# Patient Record
Sex: FEMALE | Race: BLACK OR AFRICAN AMERICAN | NOT HISPANIC OR LATINO | Employment: FULL TIME | URBAN - NONMETROPOLITAN AREA
[De-identification: names, ages, dates, MRNs, and addresses within clinical notes are randomized per-mention and may not be internally consistent; named-entity substitution may affect disease eponyms.]

---

## 2017-02-17 ENCOUNTER — TRANSCRIBE ORDERS (OUTPATIENT)
Dept: LAB | Facility: HOSPITAL | Age: 47
End: 2017-02-17

## 2017-02-17 ENCOUNTER — HOSPITAL ENCOUNTER (OUTPATIENT)
Dept: GENERAL RADIOLOGY | Facility: HOSPITAL | Age: 47
Discharge: HOME | End: 2017-02-17
Admitting: NURSE PRACTITIONER

## 2017-02-17 DIAGNOSIS — M25.552 LEFT HIP PAIN: ICD-10-CM

## 2017-02-17 DIAGNOSIS — M25.551 RIGHT HIP PAIN: ICD-10-CM

## 2017-02-17 DIAGNOSIS — M25.551 RIGHT HIP PAIN: Primary | ICD-10-CM

## 2017-02-17 PROCEDURE — 73522 X-RAY EXAM HIPS BI 3-4 VIEWS: CPT

## 2017-04-05 ENCOUNTER — HOSPITAL ENCOUNTER (OUTPATIENT)
Dept: GENERAL RADIOLOGY | Facility: HOSPITAL | Age: 47
Discharge: HOME | End: 2017-04-05
Admitting: ORTHOPAEDIC SURGERY

## 2017-04-05 ENCOUNTER — TRANSCRIBE ORDERS (OUTPATIENT)
Dept: ADMINISTRATIVE | Facility: HOSPITAL | Age: 47
End: 2017-04-05

## 2017-04-05 DIAGNOSIS — M54.5 LOW BACK PAIN, UNSPECIFIED BACK PAIN LATERALITY, UNSPECIFIED CHRONICITY, WITH SCIATICA PRESENCE UNSPECIFIED: Primary | ICD-10-CM

## 2017-04-05 PROCEDURE — 72100 X-RAY EXAM L-S SPINE 2/3 VWS: CPT

## 2017-04-06 ENCOUNTER — TRANSCRIBE ORDERS (OUTPATIENT)
Dept: ADMINISTRATIVE | Facility: HOSPITAL | Age: 47
End: 2017-04-06

## 2017-04-06 DIAGNOSIS — M53.86 LOW BACK DERANGEMENT SYNDROME: Primary | ICD-10-CM

## 2017-04-13 ENCOUNTER — HOSPITAL ENCOUNTER (OUTPATIENT)
Dept: MRI IMAGING | Facility: HOSPITAL | Age: 47
Discharge: HOME | End: 2017-04-13

## 2017-04-13 DIAGNOSIS — M53.86 LOW BACK DERANGEMENT SYNDROME: ICD-10-CM

## 2017-04-25 ENCOUNTER — HOSPITAL ENCOUNTER (OUTPATIENT)
Dept: MRI IMAGING | Facility: HOSPITAL | Age: 47
Discharge: HOME | End: 2017-04-25
Admitting: ORTHOPAEDIC SURGERY

## 2017-04-25 PROCEDURE — 72148 MRI LUMBAR SPINE W/O DYE: CPT

## 2017-10-16 ENCOUNTER — APPOINTMENT (OUTPATIENT)
Dept: ULTRASOUND IMAGING | Facility: HOSPITAL | Age: 47
End: 2017-10-16

## 2017-10-16 ENCOUNTER — HOSPITAL ENCOUNTER (EMERGENCY)
Facility: HOSPITAL | Age: 47
Discharge: HOME | End: 2017-10-16
Admitting: EMERGENCY MEDICINE

## 2017-10-16 VITALS
WEIGHT: 293 LBS | DIASTOLIC BLOOD PRESSURE: 71 MMHG | OXYGEN SATURATION: 98 % | TEMPERATURE: 100.9 F | HEART RATE: 105 BPM | HEIGHT: 72 IN | SYSTOLIC BLOOD PRESSURE: 118 MMHG | RESPIRATION RATE: 18 BRPM | BODY MASS INDEX: 39.68 KG/M2

## 2017-10-16 DIAGNOSIS — L03.119 CELLULITIS OF LOWER LEG: Primary | ICD-10-CM

## 2017-10-16 LAB
ALBUMIN SERPL-MCNC: 4.3 G/DL (ref 3.5–5)
ALBUMIN/GLOB SERPL: 1.1 G/DL (ref 1.1–2.5)
ALP SERPL-CCNC: 68 U/L (ref 24–120)
ALT SERPL W P-5'-P-CCNC: 22 U/L (ref 0–54)
ANION GAP SERPL CALCULATED.3IONS-SCNC: 11 MMOL/L (ref 4–13)
AST SERPL-CCNC: 18 U/L (ref 7–45)
BASOPHILS # BLD AUTO: 0.02 10*3/MM3 (ref 0–0.2)
BASOPHILS NFR BLD AUTO: 0.1 % (ref 0–2)
BILIRUB SERPL-MCNC: 0.8 MG/DL (ref 0.1–1)
BUN BLD-MCNC: 15 MG/DL (ref 5–21)
BUN/CREAT SERPL: 20 (ref 7–25)
CALCIUM SPEC-SCNC: 9 MG/DL (ref 8.4–10.4)
CHLORIDE SERPL-SCNC: 96 MMOL/L (ref 98–110)
CO2 SERPL-SCNC: 30 MMOL/L (ref 24–31)
CREAT BLD-MCNC: 0.75 MG/DL (ref 0.5–1.4)
CRP SERPL-MCNC: 5.28 MG/DL (ref 0–0.99)
D-LACTATE SERPL-SCNC: 1.3 MMOL/L (ref 0.5–2)
DEPRECATED RDW RBC AUTO: 44.4 FL (ref 40–54)
EOSINOPHIL # BLD AUTO: 0.15 10*3/MM3 (ref 0–0.7)
EOSINOPHIL NFR BLD AUTO: 0.9 % (ref 0–4)
ERYTHROCYTE [DISTWIDTH] IN BLOOD BY AUTOMATED COUNT: 14.5 % (ref 12–15)
ERYTHROCYTE [SEDIMENTATION RATE] IN BLOOD: 26 MM/HR (ref 0–20)
GFR SERPL CREATININE-BSD FRML MDRD: 100 ML/MIN/1.73
GLOBULIN UR ELPH-MCNC: 3.9 GM/DL
GLUCOSE BLD-MCNC: 104 MG/DL (ref 70–100)
HCT VFR BLD AUTO: 36.6 % (ref 37–47)
HGB BLD-MCNC: 11.8 G/DL (ref 12–16)
HOLD SPECIMEN: NORMAL
IMM GRANULOCYTES # BLD: 0.06 10*3/MM3 (ref 0–0.03)
IMM GRANULOCYTES NFR BLD: 0.4 % (ref 0–5)
LYMPHOCYTES # BLD AUTO: 1.09 10*3/MM3 (ref 0.72–4.86)
LYMPHOCYTES NFR BLD AUTO: 6.7 % (ref 15–45)
MCH RBC QN AUTO: 26.9 PG (ref 28–32)
MCHC RBC AUTO-ENTMCNC: 32.2 G/DL (ref 33–36)
MCV RBC AUTO: 83.6 FL (ref 82–98)
MONOCYTES # BLD AUTO: 0.57 10*3/MM3 (ref 0.19–1.3)
MONOCYTES NFR BLD AUTO: 3.5 % (ref 4–12)
NEUTROPHILS # BLD AUTO: 14.48 10*3/MM3 (ref 1.87–8.4)
NEUTROPHILS NFR BLD AUTO: 88.4 % (ref 39–78)
NT-PROBNP SERPL-MCNC: 44.6 PG/ML (ref 0–450)
PLATELET # BLD AUTO: 265 10*3/MM3 (ref 130–400)
PMV BLD AUTO: 11.1 FL (ref 6–12)
POTASSIUM BLD-SCNC: 3.3 MMOL/L (ref 3.5–5.3)
PROCALCITONIN SERPL-MCNC: <0.25 NG/ML
PROT SERPL-MCNC: 8.2 G/DL (ref 6.3–8.7)
RBC # BLD AUTO: 4.38 10*6/MM3 (ref 4.2–5.4)
SODIUM BLD-SCNC: 137 MMOL/L (ref 135–145)
WBC NRBC COR # BLD: 16.37 10*3/MM3 (ref 4.8–10.8)
WHOLE BLOOD HOLD SPECIMEN: NORMAL

## 2017-10-16 PROCEDURE — 87205 SMEAR GRAM STAIN: CPT | Performed by: PHYSICIAN ASSISTANT

## 2017-10-16 PROCEDURE — 86140 C-REACTIVE PROTEIN: CPT | Performed by: PHYSICIAN ASSISTANT

## 2017-10-16 PROCEDURE — 83605 ASSAY OF LACTIC ACID: CPT | Performed by: PHYSICIAN ASSISTANT

## 2017-10-16 PROCEDURE — 93971 EXTREMITY STUDY: CPT | Performed by: SURGERY

## 2017-10-16 PROCEDURE — 93971 EXTREMITY STUDY: CPT

## 2017-10-16 PROCEDURE — 99284 EMERGENCY DEPT VISIT MOD MDM: CPT

## 2017-10-16 PROCEDURE — 87077 CULTURE AEROBIC IDENTIFY: CPT | Performed by: PHYSICIAN ASSISTANT

## 2017-10-16 PROCEDURE — 87070 CULTURE OTHR SPECIMN AEROBIC: CPT | Performed by: PHYSICIAN ASSISTANT

## 2017-10-16 PROCEDURE — 96372 THER/PROPH/DIAG INJ SC/IM: CPT

## 2017-10-16 PROCEDURE — 87147 CULTURE TYPE IMMUNOLOGIC: CPT | Performed by: PHYSICIAN ASSISTANT

## 2017-10-16 PROCEDURE — 87186 SC STD MICRODIL/AGAR DIL: CPT | Performed by: PHYSICIAN ASSISTANT

## 2017-10-16 PROCEDURE — 80053 COMPREHEN METABOLIC PANEL: CPT | Performed by: PHYSICIAN ASSISTANT

## 2017-10-16 PROCEDURE — 83880 ASSAY OF NATRIURETIC PEPTIDE: CPT | Performed by: PHYSICIAN ASSISTANT

## 2017-10-16 PROCEDURE — 85025 COMPLETE CBC W/AUTO DIFF WBC: CPT | Performed by: PHYSICIAN ASSISTANT

## 2017-10-16 PROCEDURE — 87185 SC STD ENZYME DETCJ PER NZM: CPT | Performed by: PHYSICIAN ASSISTANT

## 2017-10-16 PROCEDURE — 85651 RBC SED RATE NONAUTOMATED: CPT | Performed by: PHYSICIAN ASSISTANT

## 2017-10-16 PROCEDURE — 84145 PROCALCITONIN (PCT): CPT | Performed by: PHYSICIAN ASSISTANT

## 2017-10-16 RX ORDER — DICLOFENAC SODIUM 75 MG/1
75 TABLET, DELAYED RELEASE ORAL 2 TIMES DAILY
Qty: 14 TABLET | Refills: 0 | Status: SHIPPED | OUTPATIENT
Start: 2017-10-16 | End: 2017-12-12

## 2017-10-16 RX ORDER — CLINDAMYCIN HYDROCHLORIDE 300 MG/1
300 CAPSULE ORAL 3 TIMES DAILY
Qty: 30 CAPSULE | Refills: 0 | Status: SHIPPED | OUTPATIENT
Start: 2017-10-16 | End: 2017-12-12

## 2017-10-16 RX ORDER — CLINDAMYCIN PHOSPHATE 150 MG/ML
600 INJECTION, SOLUTION INTRAVENOUS ONCE
Status: COMPLETED | OUTPATIENT
Start: 2017-10-16 | End: 2017-10-16

## 2017-10-16 RX ADMIN — CLINDAMYCIN PHOSPHATE 150 MG: 150 INJECTION, SOLUTION INTRAMUSCULAR; INTRAVENOUS at 20:41

## 2017-10-17 NOTE — ED PROVIDER NOTES
Subjective   History of Present Illness  47-year-old female presents with chief complaint of left lower leg pain and warmth.  The patient reports to warmth began last night however she has had pain and an unusual rash appear on her left lower leg roughly 3 weeks ago.  She has been applying triple antibiotic ointment on it and has not helped his progressively getting worse.  She had went to an urgent care clinic prior to arrival and they informed her she needed to be ruled out for DVT.  Patient does note she has some very mild Tenderness but most of her pain is the anterior part of her leg.  Pain to be reproduced with bearing weight and palpation.  Patient does confirm she had a fever last night denies chest pain shortness of breath nausea vomiting diarrhea  Review of Systems   All other systems reviewed and are negative.      Past Medical History:   Diagnosis Date   • Anxiety    • Hypertension        No Known Allergies    Past Surgical History:   Procedure Laterality Date   •  SECTION     • ENDOMETRIAL ABLATION         History reviewed. No pertinent family history.    Social History     Social History   • Marital status: Single     Spouse name: N/A   • Number of children: N/A   • Years of education: N/A     Social History Main Topics   • Smoking status: Never Smoker   • Smokeless tobacco: Never Used   • Alcohol use No   • Drug use: No   • Sexual activity: Defer     Other Topics Concern   • None     Social History Narrative           Objective   Physical Exam   Constitutional: She is oriented to person, place, and time. She appears well-developed and well-nourished.   Obese   HENT:   Head: Normocephalic.   Eyes: EOM are normal. Pupils are equal, round, and reactive to light.   Neck: Normal range of motion.   Cardiovascular:   Sinus tachycardia   Pulmonary/Chest: Effort normal.   Abdominal: Soft.   Neurological: She is alert and oriented to person, place, and time.   Skin: Skin is warm.   Appreciable warmth  left lower leg with chronic appearing changes from dried and crusted vesicles   Psychiatric: She has a normal mood and affect. Her behavior is normal.   Nursing note and vitals reviewed.      Procedures         ED Course  ED Course                  MDM  Number of Diagnoses or Management Options  Diagnosis management comments: Patient has a saline mist without DVT.  I have offered this patient admission she declines.  We will treat patient as an outpatient and offered strong return precautions.  Treat with clindamycin and diclofenac for pain.  Patient will need a close follow-up within 48-72 hours       Amount and/or Complexity of Data Reviewed  Clinical lab tests: reviewed and ordered  Tests in the radiology section of CPT®: reviewed and ordered  Tests in the medicine section of CPT®: reviewed and ordered    Risk of Complications, Morbidity, and/or Mortality  Presenting problems: moderate  Diagnostic procedures: moderate  Management options: moderate    Patient Progress  Patient progress: improved      Final diagnoses:   Cellulitis of lower leg            Michael Yanez PA-C  10/16/17 3251

## 2017-10-17 NOTE — ED NOTES
WOUND CULTURE TO LEFT ANKLE, NO OPEN WOUND NOTED AT THIS TIME.     Mary Jane Verduzco RN  10/16/17 2021

## 2017-10-20 LAB
B-LACTAMASE USUAL SUSC ISLT: POSITIVE
BACTERIA SPEC AEROBE CULT: ABNORMAL
GRAM STN SPEC: ABNORMAL
GRAM STN SPEC: ABNORMAL

## 2017-10-20 NOTE — ED NOTES
"ED Call Back Questions    1. How are you doing since leaving the Emergency Department?    Doing very well,the medicine has helped  2. Do you have any questions about your discharge instructions? No     3. Have you filled your new prescriptions yet? Yes   a. Do you have any questions about those medications? N/A    4. Were you able to make a follow-up appointment with the physician? Yes     5. Do you have a primary care physician? Yes   a. If No, would you like for me to set you up with one? N/A  i. If Yes, “I will have our ED  give you a call right back at this number to work with you on the best time for an appointment.”    6. We are always looking to get better at what we do. Do you have any suggestions for what we can do to be even better? No   a. If Yes, \"Thank you for sharing your concerns. I apologize. I will follow up with our manager and patient . Would you like someone to call you back?\" No     7. Is there anything else I can do for you? No   My visy went very well     Trace Chiang  10/20/17 1045    "

## 2017-10-30 ENCOUNTER — APPOINTMENT (OUTPATIENT)
Dept: WOUND CARE | Facility: HOSPITAL | Age: 47
End: 2017-10-30

## 2017-10-30 PROCEDURE — G0463 HOSPITAL OUTPT CLINIC VISIT: HCPCS

## 2017-11-07 ENCOUNTER — APPOINTMENT (OUTPATIENT)
Dept: WOUND CARE | Facility: HOSPITAL | Age: 47
End: 2017-11-07

## 2017-11-14 ENCOUNTER — APPOINTMENT (OUTPATIENT)
Dept: WOUND CARE | Facility: HOSPITAL | Age: 47
End: 2017-11-14

## 2017-11-22 ENCOUNTER — APPOINTMENT (OUTPATIENT)
Dept: WOUND CARE | Facility: HOSPITAL | Age: 47
End: 2017-11-22

## 2017-11-28 ENCOUNTER — APPOINTMENT (OUTPATIENT)
Dept: WOUND CARE | Facility: HOSPITAL | Age: 47
End: 2017-11-28

## 2017-11-29 ENCOUNTER — APPOINTMENT (OUTPATIENT)
Dept: WOUND CARE | Facility: HOSPITAL | Age: 47
End: 2017-11-29

## 2017-12-05 ENCOUNTER — APPOINTMENT (OUTPATIENT)
Dept: WOUND CARE | Facility: HOSPITAL | Age: 47
End: 2017-12-05

## 2017-12-05 PROCEDURE — G0463 HOSPITAL OUTPT CLINIC VISIT: HCPCS

## 2017-12-12 ENCOUNTER — OFFICE VISIT (OUTPATIENT)
Dept: VASCULAR SURGERY | Facility: CLINIC | Age: 47
End: 2017-12-12

## 2017-12-12 VITALS
WEIGHT: 293 LBS | BODY MASS INDEX: 39.68 KG/M2 | HEART RATE: 88 BPM | HEIGHT: 72 IN | SYSTOLIC BLOOD PRESSURE: 118 MMHG | DIASTOLIC BLOOD PRESSURE: 86 MMHG

## 2017-12-12 DIAGNOSIS — M79.89 LEG SWELLING: ICD-10-CM

## 2017-12-12 DIAGNOSIS — I89.0 LYMPHEDEMA: Primary | ICD-10-CM

## 2017-12-12 DIAGNOSIS — I87.2 CHRONIC VENOUS INSUFFICIENCY: ICD-10-CM

## 2017-12-12 DIAGNOSIS — I10 ESSENTIAL HYPERTENSION: ICD-10-CM

## 2017-12-12 PROCEDURE — 99203 OFFICE O/P NEW LOW 30 MIN: CPT | Performed by: SURGERY

## 2017-12-12 RX ORDER — HYDROCHLOROTHIAZIDE 50 MG/1
TABLET ORAL
Refills: 1 | COMMUNITY
Start: 2017-10-26 | End: 2018-11-30 | Stop reason: SDUPTHER

## 2017-12-12 NOTE — PROGRESS NOTES
2017      Marty Marks MD  [address] [address]    Chioma Young  1970    Chief Complaint   Patient presents with   • Chronic Venous Insufficiency     Ulcerations to left leg which has healed.Recently discharged from [facility].   • Leg Swelling       Dear Marty Marks MD:      HPI  I had the pleasure of seeing your patient Chioma Young in the office today.  Thank you kindly for this consultation.  As you recall, Chioma Young is a 47 y.o.  female who you are currently following for Left lower extremity venous wounds with cellulitis.  She has been recently released from wound care, but recently had wounds to the top of her left foot and ankle.  She also had significant swelling to her lower extremities.  She denies any previous history of DVT or phlebitis.    Past Medical History:   Diagnosis Date   • Anxiety    • Chronic ulcer of leg    • Chronic ulcer of right foot    • Chronic venous insufficiency    • Hypertension    • Lymphedema        Past Surgical History:   Procedure Laterality Date   •  SECTION     • ENDOMETRIAL ABLATION         Family History   Problem Relation Age of Onset   • Hypertension Mother    • Depression Mother    • Diabetes Father        Social History     Social History   • Marital status: Single     Spouse name: N/A   • Number of children: N/A   • Years of education: N/A     Occupational History   • Not on file.     Social History Main Topics   • Smoking status: Never Smoker   • Smokeless tobacco: Never Used   • Alcohol use No   • Drug use: No   • Sexual activity: Defer     Other Topics Concern   • Not on file     Social History Narrative       No Known Allergies    Prior to Admission medications    Medication Sig Start Date End Date Taking? Authorizing Provider   Aspirin-Caffeine (AIXA BACK & BODY PO) Take  by mouth As Needed.   Yes Historical Provider, MD   benazepril (LOTENSIN) 20 MG tablet Take 20 mg by mouth Daily.    "Yes Nurse [facility], RN   hydrochlorothiazide (HYDRODIURIL) 50 MG tablet TAKE 1 TABLET BY MOUTH EVERY DAY 10/26/17  Yes Historical Provider, MD   sertraline (ZOLOFT) 50 MG tablet Take 50 mg by mouth Daily.   Yes Nurse [facility], RN   hydrochlorothiazide (HYDRODIURIL) 12.5 MG tablet Take 50 mg by mouth Daily.  12/12/17 Yes Nurse [facility], RN   clindamycin (CLEOCIN) 300 MG capsule Take 1 capsule by mouth 3 (Three) Times a Day. 10/16/17 12/12/17  Michael Yanez PA-C   diclofenac (VOLTAREN) 75 MG EC tablet Take 1 tablet by mouth 2 (Two) Times a Day. 10/16/17 12/12/17  Michael Yanez PA-C       Review of Systems   Constitutional: Negative.    HENT: Negative.    Eyes: Negative.    Respiratory: Negative.    Cardiovascular: Positive for leg swelling.        Leg cramping   Gastrointestinal: Negative.    Endocrine: Negative.    Genitourinary: Negative.    Musculoskeletal: Negative.    Skin: Negative.    Allergic/Immunologic: Negative.    Neurological: Negative.    Hematological: Negative.    Psychiatric/Behavioral: Negative.        /86  Pulse 88  Ht 185.4 cm (73\")  Wt (!) 191 kg (420 lb)  BMI 55.41 kg/m2  Physical Exam   Constitutional: She is oriented to person, place, and time. She appears well-developed and well-nourished.   HENT:   Head: Normocephalic and atraumatic.   Eyes: Pupils are equal, round, and reactive to light. No scleral icterus.   Neck: Neck supple. No JVD present. Carotid bruit is not present. No thyromegaly present.   Cardiovascular: Normal rate, regular rhythm and normal heart sounds.    Pulses:       Carotid pulses are 2+ on the right side, and 2+ on the left side.       Femoral pulses are 2+ on the right side, and 2+ on the left side.       Popliteal pulses are 2+ on the right side, and 2+ on the left side.        Dorsalis pedis pulses are 2+ on the right side, and 2+ on the left side.        Posterior tibial pulses are 2+ on the right side, and 2+ on the left side. "   Pulmonary/Chest: Effort normal and breath sounds normal.   Abdominal: Soft. Bowel sounds are normal. She exhibits no distension, no abdominal bruit and no mass. There is no hepatosplenomegaly. There is no tenderness.   Musculoskeletal: Normal range of motion. She exhibits no edema.   Lymphadenopathy:     She has no cervical adenopathy.   Neurological: She is alert and oriented to person, place, and time. She has normal strength. No cranial nerve deficit or sensory deficit.   Skin: Skin is warm, dry and intact.   Psychiatric: She has a normal mood and affect.   Nursing note and vitals reviewed.      No results found.    Patient Active Problem List   Diagnosis   • Lymphedema   • Hypertension   • Chronic venous insufficiency   • Chronic ulcer of right foot   • Chronic ulcer of leg   • Anxiety         ICD-10-CM ICD-9-CM   1. Lymphedema I89.0 457.1   2. Leg swelling M79.89 729.81   3. Chronic venous insufficiency I87.2 459.81   4. Essential hypertension I10 401.9           Plan: After thoroughly evaluating Chioma Young, I believe the best course of action is to Remain conservative from a vascular surgery standpoint.  Currently, she has no evidence of any wounds and they are healed.  I would recommend that she wear knee-high compression stockings in the 20-30 mm pressure gradient range.  I also asked her to keep her legs elevated when she is not on them and to keep them well moisturized.  She would like to try the compression stockings first.  We did offer for her to see [facility] for lymphedema management.  The patient can continue taking her current medication regimen as previously planned.  This was all discussed in full with complete understanding.  We will see her back in 6 months time with a 2 leg VVI for further assessment.  I also counseled her on her vascular risk factors with regards to obesity and hypertension.    Thank you for allowing me to participate in the care of your patient.  Please do not  hesitate with any questions or concerns.  I will keep you aware of any further encounters with Chioma Young.        Sincerely yours,         Vinod Jo, DO    Jonathon Agustin, DO

## 2018-06-15 ENCOUNTER — APPOINTMENT (OUTPATIENT)
Dept: ULTRASOUND IMAGING | Facility: HOSPITAL | Age: 48
End: 2018-06-15

## 2018-10-27 ENCOUNTER — OFFICE VISIT (OUTPATIENT)
Dept: RETAIL CLINIC | Facility: CLINIC | Age: 48
End: 2018-10-27

## 2018-10-27 VITALS
HEART RATE: 85 BPM | TEMPERATURE: 97.5 F | DIASTOLIC BLOOD PRESSURE: 90 MMHG | WEIGHT: 293 LBS | RESPIRATION RATE: 18 BRPM | HEIGHT: 72 IN | SYSTOLIC BLOOD PRESSURE: 132 MMHG | OXYGEN SATURATION: 99 % | BODY MASS INDEX: 39.68 KG/M2

## 2018-10-27 DIAGNOSIS — S81.802A WOUND OF LEFT LOWER EXTREMITY, INITIAL ENCOUNTER: ICD-10-CM

## 2018-10-27 DIAGNOSIS — R21 RASH AND NONSPECIFIC SKIN ERUPTION: Primary | ICD-10-CM

## 2018-10-27 DIAGNOSIS — Z76.89 REFERRAL OF PATIENT: ICD-10-CM

## 2018-10-27 DIAGNOSIS — I87.2 CHRONIC VENOUS INSUFFICIENCY: ICD-10-CM

## 2018-10-27 PROCEDURE — 99214 OFFICE O/P EST MOD 30 MIN: CPT | Performed by: NURSE PRACTITIONER

## 2018-10-27 RX ORDER — GINSENG 100 MG
CAPSULE ORAL 2 TIMES DAILY
Qty: 28 G | Refills: 0 | Status: SHIPPED | OUTPATIENT
Start: 2018-10-27

## 2018-10-27 RX ORDER — CLOTRIMAZOLE AND BETAMETHASONE DIPROPIONATE 10; .64 MG/G; MG/G
CREAM TOPICAL EVERY 12 HOURS SCHEDULED
Qty: 45 G | Refills: 0 | Status: SHIPPED | OUTPATIENT
Start: 2018-10-27

## 2018-10-27 RX ORDER — SULFAMETHOXAZOLE AND TRIMETHOPRIM 800; 160 MG/1; MG/1
1 TABLET ORAL 2 TIMES DAILY
Qty: 20 TABLET | Refills: 0 | Status: SHIPPED | OUTPATIENT
Start: 2018-10-27 | End: 2018-11-06

## 2018-10-27 RX ORDER — LEVOFLOXACIN 500 MG/1
500 TABLET, FILM COATED ORAL DAILY
Qty: 10 TABLET | Refills: 0 | Status: SHIPPED | OUTPATIENT
Start: 2018-10-27 | End: 2018-11-06

## 2018-10-27 NOTE — PROGRESS NOTES
Chief Complaint   Patient presents with   • Rash   • Wound Infection     Subjective   Chioma Young is a 48 y.o. female who presents to the clinic today with complaints of rash. She noted a few round flat itchy areas about four days ago. A day or so after that she noted a few scattered red itchy bumps on her arms.  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 She is unsure if she has had any exposure to bed bugs or scabies.     She also reports an infection left (top of) foot and ankle. She reports her shoe rubbed this area and then she noted the wound.  Since then her pants of rubbed the area. She has noted some bump like areas and sticky drainage.  She had a wound and cellulitis in the same area previously and saw wound care.  She has some left over Levaquin and took two a week ago and it seemed to help. Denies calf tenderness.    Rash   This is a new problem. Episode onset: 3-4 days ago. The problem has been gradually worsening since onset. The affected locations include the left arm, right arm and back. The rash is characterized by dryness and redness. She was exposed to nothing. Pertinent negatives include no fever, shortness of breath or vomiting. Past treatments include nothing.   Wound Infection   This is a new problem. Episode onset: two weeks. Progression since onset: she took two doses of Levaquin about a week ago and noted improvement. Associated symptoms include a rash. Pertinent negatives include no arthralgias, chills, fever, joint swelling, nausea or vomiting. Treatments tried: epson salt soaks.     Current Outpatient Prescriptions:   •  benazepril (LOTENSIN) 20 MG tablet, Take 20 mg by mouth Daily., Disp: , Rfl:   •  hydrochlorothiazide (HYDRODIURIL) 50 MG tablet, TAKE 1 TABLET BY MOUTH EVERY DAY, Disp: , Rfl: 1  •  sertraline (ZOLOFT) 50 MG tablet, Take 50 mg by mouth Daily., Disp: , Rfl:       Allergies:  Patient has no known allergies.    Past Medical History:  "  Diagnosis Date   • Anxiety    • Chronic ulcer of leg (CMS/HCC)    • Chronic ulcer of right foot (CMS/HCC)    • Chronic venous insufficiency    • Hypertension    • Lymphedema      Past Surgical History:   Procedure Laterality Date   •  SECTION     • ENDOMETRIAL ABLATION       Family History   Problem Relation Age of Onset   • Hypertension Mother    • Depression Mother    • Diabetes Father      Social History   Substance Use Topics   • Smoking status: Never Smoker   • Smokeless tobacco: Never Used   • Alcohol use No       Review of Systems  Review of Systems   Constitutional: Negative for chills and fever.   Respiratory: Negative for shortness of breath.    Cardiovascular: Positive for leg swelling (chronic lymphedema).   Gastrointestinal: Negative for nausea and vomiting.   Musculoskeletal: Negative for arthralgias and joint swelling.   Skin: Positive for rash and wound.       Objective   /90   Pulse 85   Temp 97.5 °F (36.4 °C) (Oral)   Resp 18   Ht 185.4 cm (73\")   Wt (!) 195 kg (430 lb 9.6 oz)   SpO2 99%   BMI 56.81 kg/m²       Physical Exam   Constitutional: She is oriented to person, place, and time. She appears well-developed and well-nourished. She is cooperative. No distress.   Cardiovascular: Normal rate, regular rhythm and normal heart sounds.    Bilateral LE edema, difficult to palpate pulses due to thickness of skin. No doppler available. Toes warm, cap refill < sec.    Pulmonary/Chest: Effort normal and breath sounds normal.        Neurological: She is alert and oriented to person, place, and time.   Skin: Skin is warm and dry.        Psychiatric: She has a normal mood and affect. Her behavior is normal.       Left foot/leg wound:  Culture obtained then area cleansed with commercial wound  and triple antibacterial ointment, non-adherent pad and kerlex applied.     Assessment/Plan     Chioma was seen today for rash and wound infection.    Diagnoses and all orders for this " visit:    Rash and nonspecific skin eruption    Wound of left lower extremity, initial encounter  -     Ambulatory Referral to Wound Clinic  -     Wound Culture - Wound, Foot, Left; Future    Chronic venous insufficiency    Referral of patient  -     Ambulatory Referral to Internal Medicine    Other orders  -     levoFLOXacin (LEVAQUIN) 500 MG tablet; Take 1 tablet by mouth Daily for 10 days.  -     sulfamethoxazole-trimethoprim (BACTRIM DS,SEPTRA DS) 800-160 MG per tablet; Take 1 tablet by mouth 2 (Two) Times a Day for 10 days.  -     clotrimazole-betamethasone (LOTRISONE) 1-0.05 % cream; Apply  topically to the appropriate area as directed Every 12 (Twelve) Hours. Until rash resolves.  Avoid use on face and breasts  -     bacitracin 500 UNIT/GM ointment; Apply  topically to the appropriate area as directed 2 (Two) Times a Day. Apply to left foot/leg wound      Keep wound clean and dry.  Apply Bacitracin twice a day.  We have referred you to wound care.  If you have not heard from them by Tuesday afternoon please call.      Elevate lower extremities for 30 minutes twice a day.     Risks and benefits of antibiotics discussed.  It is recommended that you take a probiotic when taking an antibiotic. Probiotics are found over the counter in pill form and in some brands of yogurt.      Please wash all linen and clothes in hot water. Vacuum furniture, mattresses, carpets as discussed.     If you have any worsening symptoms prior to seeing wound care please go to urgent care for evaluation.     She would like a referral to primary care and his has been made.

## 2018-10-27 NOTE — PATIENT INSTRUCTIONS
Rash  A rash is a change in the color of the skin. A rash can also change the way your skin feels. There are many different conditions and factors that can cause a rash.  Follow these instructions at home:  Pay attention to any changes in your symptoms. Follow these instructions to help with your condition:  Medicine  Take or apply over-the-counter and prescription medicines only as told by your health care provider. These may include:  · Corticosteroid cream.  · Anti-itch lotions.  · Oral antihistamines.    Skin Care  · Apply cool compresses to the affected areas.  · Try taking a bath with:  ? Epsom salts. Follow the instructions on the packaging. You can get these at your local pharmacy or grocery store.  ? Baking soda. Pour a small amount into the bath as told by your health care provider.  ? Colloidal oatmeal. Follow the instructions on the packaging. You can get this at your local pharmacy or grocery store.  · Try applying baking soda paste to your skin. Stir water into baking soda until it reaches a paste-like consistency.  · Do not scratch or rub your skin.  · Avoid covering the rash. Make sure the rash is exposed to air as much as possible.  General instructions  · Avoid hot showers or baths, which can make itching worse. A cold shower may help.  · Avoid scented soaps, detergents, and perfumes. Use gentle soaps, detergents, perfumes, and other cosmetic products.  · Avoid any substance that causes your rash. Keep a journal to help track what causes your rash. Write down:  ? What you eat.  ? What cosmetic products you use.  ? What you drink.  ? What you wear. This includes jewelry.  · Keep all follow-up visits as told by your health care provider. This is important.  Contact a health care provider if:  · You sweat at night.  · You lose weight.  · You urinate more than normal.  · You feel weak.  · You vomit.  · Your skin or the whites of your eyes look yellow (jaundice).  · Your skin:  ? Tingles.  ? Is  numb.  · Your rash:  ? Does not go away after several days.  ? Gets worse.  · You are:  ? Unusually thirsty.  ? More tired than normal.  · You have:  ? New symptoms.  ? Pain in your abdomen.  ? A fever.  ? Diarrhea.  Get help right away if:  · You develop a rash that covers all or most of your body. The rash may or may not be painful.  · You develop blisters that:  ? Are on top of the rash.  ? Grow larger or grow together.  ? Are painful.  ? Are inside your nose or mouth.  · You develop a rash that:  ? Looks like purple pinprick-sized spots all over your body.  ? Has a “bull's eye” or looks like a target.  ? Is not related to sun exposure, is red and painful, and causes your skin to peel.  This information is not intended to replace advice given to you by your health care provider. Make sure you discuss any questions you have with your health care provider.  Document Released: 12/08/2003 Document Revised: 05/23/2017 Document Reviewed: 05/04/2016  [employer] Interactive Patient Education © 2018 [employer]      Wound Care, Adult  Taking care of your wound properly can help to prevent pain and infection. It can also help your wound to heal more quickly.  How is this treated?  Wound care  · Follow instructions from your health care provider about how to take care of your wound. Make sure you:  ? Wash your hands with soap and water before you change the bandage (dressing). If soap and water are not available, use hand .  ? Change your dressing as told by your health care provider.  ? Leave stitches (sutures), skin glue, or adhesive strips in place. These skin closures may need to stay in place for 2 weeks or longer. If adhesive strip edges start to loosen and curl up, you may trim the loose edges. Do not remove adhesive strips completely unless your health care provider tells you to do that.  · Check your wound area every day for signs of infection. Check for:  ? More redness, swelling, or pain.  ? More fluid  or blood.  ? Warmth.  ? Pus or a bad smell.  · Ask your health care provider if you should clean the wound with mild soap and water. Doing this may include:  ? Using a clean towel to pat the wound dry after cleaning it. Do not rub or scrub the wound.  ? Applying a cream or ointment. Do this only as told by your health care provider.  ? Covering the incision with a clean dressing.  · Ask your health care provider when you can leave the wound uncovered.  Medicines    · If you were prescribed an antibiotic medicine, cream, or ointment, take or use the antibiotic as told by your health care provider. Do not stop taking or using the antibiotic even if your condition improves.  · Take over-the-counter and prescription medicines only as told by your health care provider. If you were prescribed pain medicine, take it at least 30 minutes before doing any wound care or as told by your health care provider.  General instructions  · Return to your normal activities as told by your health care provider. Ask your health care provider what activities are safe.  · Do not scratch or pick at the wound.  · Keep all follow-up visits as told by your health care provider. This is important.  · Eat a diet that includes protein, vitamin A, vitamin C, and other nutrient-rich foods. These help the wound heal:  ? Protein-rich foods include meat, dairy, beans, nuts, and other sources.  ? Vitamin A-rich foods include carrots and dark green, leafy vegetables.  ? Vitamin C-rich foods include citrus, tomatoes, and other fruits and vegetables.  ? Nutrient-rich foods have protein, carbohydrates, fat, vitamins, or minerals. Eat a variety of healthy foods including vegetables, fruits, and whole grains.  Contact a health care provider if:  · You received a tetanus shot and you have swelling, severe pain, redness, or bleeding at the injection site.  · Your pain is not controlled with medicine.  · You have more redness, swelling, or pain around the  wound.  · You have more fluid or blood coming from the wound.  · Your wound feels warm to the touch.  · You have pus or a bad smell coming from the wound.  · You have a fever or chills.  · You are nauseous or you vomit.  · You are dizzy.  Get help right away if:  · You have a red streak going away from your wound.  · The edges of the wound open up and separate.  · Your wound is bleeding and the bleeding does not stop with gentle pressure.  · You have a rash.  · You faint.  · You have trouble breathing.  This information is not intended to replace advice given to you by your health care provider. Make sure you discuss any questions you have with your health care provider.  Document Released: 09/26/2009 Document Revised: 08/16/2017 Document Reviewed: 07/04/2017  [employer] Interactive Patient Education © 2017 [employer]      Keep wound clean and dry.  Apply Bacitracin twice a day.  We have referred you to wound care.  If you have not heard from them by Tuesday afternoon please call.      Elevate lower extremities for 30 minutes twice a day.     Risks and benefits of antibiotics discussed.  It is recommended that you take a probiotic when taking an antibiotic. Probiotics are found over the counter in pill form and in some brands of yogurt.      Please wash all linen and clothes in hot water. Vacuum furniture, mattresses, carpets as discussed.     If you have any worsening symptoms prior to seeing wound care please go to urgent care for evaluation.

## 2018-10-31 ENCOUNTER — OFFICE VISIT (OUTPATIENT)
Dept: WOUND CARE | Facility: HOSPITAL | Age: 48
End: 2018-10-31

## 2018-10-31 ENCOUNTER — TRANSCRIBE ORDERS (OUTPATIENT)
Dept: ADMINISTRATIVE | Facility: HOSPITAL | Age: 48
End: 2018-10-31

## 2018-10-31 DIAGNOSIS — R60.9 EDEMA, UNSPECIFIED TYPE: ICD-10-CM

## 2018-10-31 DIAGNOSIS — I87.2 VENOUS INSUFFICIENCY: Primary | ICD-10-CM

## 2018-10-31 PROCEDURE — G0463 HOSPITAL OUTPT CLINIC VISIT: HCPCS

## 2018-11-07 ENCOUNTER — HOSPITAL ENCOUNTER (OUTPATIENT)
Dept: ULTRASOUND IMAGING | Facility: HOSPITAL | Age: 48
Discharge: HOME | End: 2018-11-07

## 2018-11-07 ENCOUNTER — HOSPITAL ENCOUNTER (OUTPATIENT)
Dept: ULTRASOUND IMAGING | Facility: HOSPITAL | Age: 48
Discharge: HOME | End: 2018-11-07
Admitting: NURSE PRACTITIONER

## 2018-11-07 DIAGNOSIS — R60.9 EDEMA, UNSPECIFIED TYPE: ICD-10-CM

## 2018-11-07 DIAGNOSIS — I87.2 VENOUS INSUFFICIENCY: ICD-10-CM

## 2018-11-07 PROCEDURE — 93924 LWR XTR VASC STDY BILAT: CPT | Performed by: SURGERY

## 2018-11-07 PROCEDURE — 93970 EXTREMITY STUDY: CPT

## 2018-11-07 PROCEDURE — 93970 EXTREMITY STUDY: CPT | Performed by: SURGERY

## 2018-11-07 PROCEDURE — 93923 UPR/LXTR ART STDY 3+ LVLS: CPT

## 2018-11-08 ENCOUNTER — OFFICE VISIT (OUTPATIENT)
Dept: WOUND CARE | Facility: HOSPITAL | Age: 48
End: 2018-11-08
Attending: PODIATRIST

## 2018-11-08 PROCEDURE — G0463 HOSPITAL OUTPT CLINIC VISIT: HCPCS

## 2018-11-09 ENCOUNTER — OFFICE VISIT (OUTPATIENT)
Dept: INTERNAL MEDICINE | Facility: CLINIC | Age: 48
End: 2018-11-09

## 2018-11-09 VITALS
DIASTOLIC BLOOD PRESSURE: 86 MMHG | BODY MASS INDEX: 39.68 KG/M2 | OXYGEN SATURATION: 99 % | SYSTOLIC BLOOD PRESSURE: 118 MMHG | HEART RATE: 68 BPM | RESPIRATION RATE: 12 BRPM | TEMPERATURE: 98.2 F | WEIGHT: 293 LBS | HEIGHT: 72 IN

## 2018-11-09 DIAGNOSIS — M79.672 LEFT FOOT PAIN: ICD-10-CM

## 2018-11-09 DIAGNOSIS — F41.9 ANXIETY: ICD-10-CM

## 2018-11-09 DIAGNOSIS — E66.01 MORBID OBESITY WITH BMI OF 50.0-59.9, ADULT (HCC): Primary | ICD-10-CM

## 2018-11-09 DIAGNOSIS — G47.30 SLEEP APNEA, UNSPECIFIED TYPE: ICD-10-CM

## 2018-11-09 DIAGNOSIS — E01.0 THYROMEGALY: ICD-10-CM

## 2018-11-09 PROBLEM — Z91.199 POOR COMPLIANCE: Status: ACTIVE | Noted: 2018-11-09

## 2018-11-09 PROCEDURE — 99204 OFFICE O/P NEW MOD 45 MIN: CPT | Performed by: FAMILY MEDICINE

## 2018-11-09 RX ORDER — BUPROPION HYDROCHLORIDE 150 MG/1
150 TABLET ORAL EVERY MORNING
Qty: 30 TABLET | Refills: 0 | Status: SHIPPED | OUTPATIENT
Start: 2018-11-09 | End: 2018-12-12 | Stop reason: SDUPTHER

## 2018-11-09 NOTE — ASSESSMENT & PLAN NOTE
L calcaneous pain without infectious signs on exam, potential plantar fasciitis vs ligamentous strain. Complicated by severe obesity, poor heel support in shoes  -heel inserts  -f/u if worsening

## 2018-11-09 NOTE — PROGRESS NOTES
CC:   Chief Complaint   Patient presents with   • Establish Care     Patient reports left heel pain for about a week   • Foot Pain       History:  Chioma Young is a 48 y.o. female who presents today for evaluation of the above problems.      Foot pain: has had wound care for the past 2 weeks, recent discharge from their services. She is supposed to have brace for her legs due to chronic lymphedema. Pain is at the heel, 8/10, mostly when she placed pressure on her foot. Seen yesterday by wound care, thought her flip flop shoes were causing her pain. Better with wrapping leg for lymphedema Tx.     Severe morbid obesity: weight increase from 369-434 lbs over the past year, says she does not like water, she reports she has poor appetite and sometimes forces herself to eat. Yesterday she had a burrito and hashbrown for breakfast, got a sampler at [employer] for lunch, turkey sandwich for dinner which she says is a lot for her.  Her sisters have DM, she checks her blood sugars and they are routinely in the 70s. She does snore, has not had a sleep study. Was seen by bariatric surgery, insurance would not cover it per her report.     Stop bang score 5    Does not take any of her meds routinely, takes sertraline on occasions. Says that she has social phobia, claustrophobia, some mild depression. Has never tried wellbutrin.     ROS:  Review of Systems   Musculoskeletal: Positive for arthralgias.   Psychiatric/Behavioral: The patient is nervous/anxious.    All other systems reviewed and are negative.    No Known Allergies  Past Medical History:   Diagnosis Date   • Anxiety    • Chronic ulcer of leg (CMS/HCC)    • Chronic ulcer of right foot (CMS/HCC)    • Chronic venous insufficiency    • Hypertension    • Lymphedema    • Morbid obesity (CMS/HCC)    • Synovial cyst of lumbar spine      Past Surgical History:   Procedure Laterality Date   •  SECTION     • ENDOMETRIAL ABLATION       Family History   Problem Relation  "Age of Onset   • Depression Mother    • Hypertension Mother    • Diabetes Father    • Hypertension Father    • Diabetes Sister    • Hypertension Sister    • Hypertension Brother    • Hypertension Maternal Grandmother    • Dementia Maternal Grandfather    • Diabetes Paternal Grandmother       reports that she has never smoked. She has never used smokeless tobacco. She reports that she does not drink alcohol or use drugs.    Current Outpatient Prescriptions:   •  Aspirin-Caffeine (AIXA BACK & BODY PO), Take  by mouth As Needed., Disp: , Rfl:   •  bacitracin 500 UNIT/GM ointment, Apply  topically to the appropriate area as directed 2 (Two) Times a Day. Apply to left foot/leg wound, Disp: 28 g, Rfl: 0  •  benazepril (LOTENSIN) 20 MG tablet, Take 20 mg by mouth Daily., Disp: , Rfl:   •  hydrochlorothiazide (HYDRODIURIL) 50 MG tablet, TAKE 1 TABLET BY MOUTH EVERY DAY, Disp: , Rfl: 1  •  buPROPion XL (WELLBUTRIN XL) 150 MG 24 hr tablet, Take 1 tablet by mouth Every Morning., Disp: 30 tablet, Rfl: 0  •  clotrimazole-betamethasone (LOTRISONE) 1-0.05 % cream, Apply  topically to the appropriate area as directed Every 12 (Twelve) Hours. Until rash resolves.  Avoid use on face and breasts, Disp: 45 g, Rfl: 0    OBJECTIVE:  /86 (BP Location: Left arm, Patient Position: Sitting, Cuff Size: Large Adult)   Pulse 68   Temp 98.2 °F (36.8 °C) (Oral)   Resp 12   Ht 185.4 cm (73\")   Wt (!) 197 kg (434 lb 6 oz)   SpO2 99%   BMI 57.31 kg/m²      Physical Exam   Constitutional: She is oriented to person, place, and time.   Morbidly obese without distress   HENT:   Head: Normocephalic and atraumatic.   Eyes: Pupils are equal, round, and reactive to light. Right eye exhibits no discharge.   Neck: Neck supple. No JVD present. No tracheal deviation present. Thyromegaly: diffusely enlarged without palpable nodule.   Cardiovascular: Normal rate, regular rhythm and normal heart sounds.  Exam reveals no gallop and no friction rub.  "   No murmur heard.  Pulmonary/Chest: Effort normal and breath sounds normal. No respiratory distress. She has no wheezes. She has no rales.   Abdominal: Soft.   Musculoskeletal: Edema: 3+LE. Tenderness: left heel.   Normal ROM L ankle, mild tenderness to palpation distal calcaneous without pain to palpation of plantar fascia. No warmth or erythema   Lymphadenopathy:     She has no cervical adenopathy.   Neurological: She is alert and oriented to person, place, and time.   Skin: Skin is warm and dry.   Well bandaged lymphedema wounds on left lower extremity   Psychiatric: Her behavior is normal. Judgment and thought content normal.   Mildly anxious     Assessment/Plan    Problem List Items Addressed This Visit     Anxiety     Wellbutrin trial         Relevant Medications    buPROPion XL (WELLBUTRIN XL) 150 MG 24 hr tablet    Morbid obesity with BMI of 50.0-59.9, adult (CMS/[facility]) - Primary     Obesity is worsening over the past year with complications of HTN, lymphedema, likely sleep apnea. Reports insurance would not pay for bariatric surgery  -encouraged continued dietary diligence, referral to nutrition today  -encouraged exercise, recommended yoga  -referral to nutrition  -lipid panel, TSH given thyromegaly         Relevant Orders    Lipid panel    TSH    Ambulatory Referral to Nutrition Services    Sleep apnea    Relevant Orders    Polysomnography 4 or More Parameters With CPAP    Left foot pain     L calcaneous pain without infectious signs on exam, potential plantar fasciitis vs ligamentous strain. Complicated by severe obesity, poor heel support in shoes  -heel inserts  -f/u if worsening         Thyromegaly     TSH               Patient's Body mass index is 57.31 kg/m². BMI is above normal parameters. Recommendations include: exercise counseling, nutrition counseling and referral to a nutritionist.    An After Visit Summary was printed and given to the patient at discharge.  Return in about 4 weeks (around  12/7/2018) for recheck mood .         Amilcar Tijerina D.O.  [facility]  Osteopathic Neuromusculoskeletal Medicine  11/9/2018

## 2018-11-09 NOTE — ASSESSMENT & PLAN NOTE
Obesity is worsening over the past year with complications of HTN, lymphedema, likely sleep apnea. Reports insurance would not pay for bariatric surgery  -encouraged continued dietary diligence, referral to nutrition today  -encouraged exercise, recommended yoga  -referral to nutrition  -lipid panel, TSH given thyromegaly

## 2018-11-09 NOTE — PATIENT INSTRUCTIONS
What is Osteopathic Medicine  Osteopathic medicine provides all of the benefits of modern medicine including prescription drugs, surgery, and the use of technology to diagnose disease and evaluate injury. It also offers the added benefit of hands-on diagnosis and treatment through a system of therapy known as osteopathic manipulative medicine. Osteopathic medicine emphasizes helping each person achieve a high level of wellness by focusing on health promotion and disease prevention. ([email or web address])     What is a DO  Doctor's of Osteopathy (DO) are fully trained physicians, capable of practicing the entire scope of medicine. In addition to conventional medical practice, DO’s are trained to use their hands to palpate (feel) tissue function and dysfunction. Gentle manipulative techniques are applied, restoring optimal function (motion).     4 Principles define Osteopathic Medicine  • The body is a fully integrated being of body, mind and spirit  • The body is capable of self-regulation, self-healing, and health maintenance  • Structure and function are interrelated  • Rational treatment is based upon an understanding of the basic principles of body unity, self-regulation, and the relationship of structure and function     Osteopathic Manipulative Medicine (OMM)   OMM encompasses a wide range of techniques addressing problems in joints, ligaments, muscles, tendons, and fascia (tissue surrounding muscles and other organs) that may cause pain or interfere with the body’s function. When there are restrictions within the structure of the body it does not function properly, often times causing pain. Using different techniques (listed below) the restrictions in the body are relieved so the body can function at optimal health. Patients often experience a sense of deep relaxation, tingling, fluid flows and relief of pain. These changes may be experienced immediately as they occur or later after the treatment. OMM may be referred to  as Osteopathic Manipulative Treatment (OMT).     Common Treatment Modalities  Osteopathy in the Cranial Field- a system of treatment that utilizes the intrinsic motion of the cranial and neurological system to treat the whole body     Myofascial Release - used to treat restrictions of muscle and fascia     Counterstrain - focused on specific tender points on the body that are held in a position of comfort for 90 seconds, after which the tenderness is relieved     Muscle Energy - uses the relaxation after a muscle is contracted to stretch muscles and increase range of motion     Balanced Ligamentous Tension - ligaments or joints are placed into a state of balanced until the tension is relieved     Facilitated Positional Release - patient’s spine is placed at neutral position while the isolated segment for treatment is placed at ease. Compression or traction is then added to release the tension in the muscle, fascia, and/or joints     High Velocity Low Amplitude (HVLA)- use of fast, short thrusts through restricted joints; a technique with which most people are familiar (also known as the “cracking” or “popping” technique)     Who would benefit  Osteopathy treats the patient, not the disease. Our intention is to find and restore health as well as structural integrity and fluid continuity.      Some of the problems that typically respond to osteopathic treatment:  · SOMATIC PAIN  · Back, neck, and joint pain  · Sciatica  · Headaches/Migraines  · Temporal Mandibular Joint Dysfunction (TMJ)     · TRAUMATIC INJURIES  · Head trauma  · Post Concussion Syndrome  · Overuse Syndromes  · Whiplash Syndromes     · CHRONIC CONDITIONS UNRESPONSIVE TO CONVENTIAL TREATMENT  · Neurologic disorders  · Gastrointestinal disorders  · Genitourinary disorders  · Respiratory Disorders     · WOMEN DURING PREGNANCY can be made more comfortable, their labor and delivery eased considerably by providing freedom to the ligamentous support of the  uterus and pelvis.     ADDITIONAL RESOURCES  www.osteopathic.org  www.osteodoc.com  http://www.do-DocRun.com/aboutosteopathy/research/ (Research articles)  Book: Dr. Gutierrez’s Touch of Life by Jonathon Gutierrez DO     Information gathered from [email or web address],  aacom.org, A Brief Guide to Osteopathic Medicine.

## 2018-11-15 ENCOUNTER — TELEPHONE (OUTPATIENT)
Dept: VASCULAR SURGERY | Facility: CLINIC | Age: 48
End: 2018-11-15

## 2018-11-15 NOTE — TELEPHONE ENCOUNTER
Left message reminding Ms Young of her appointment for Friday, November 16th, 2018 at 1045 am with Carla ANN. Also advised if she had any questions or needed to reschedule to please call the office at [phone].

## 2018-11-16 ENCOUNTER — TELEPHONE (OUTPATIENT)
Dept: VASCULAR SURGERY | Facility: CLINIC | Age: 48
End: 2018-11-16

## 2018-11-16 NOTE — TELEPHONE ENCOUNTER
I called the patient to follow up and reschedule the appointment she missed this morning with Carla.  I had to leave a message.

## 2018-11-30 RX ORDER — HYDROCHLOROTHIAZIDE 50 MG/1
50 TABLET ORAL DAILY
Qty: 30 TABLET | Refills: 5 | Status: SHIPPED | OUTPATIENT
Start: 2018-11-30

## 2018-11-30 RX ORDER — BENAZEPRIL HYDROCHLORIDE 20 MG/1
20 TABLET ORAL DAILY
Qty: 30 TABLET | Refills: 5 | Status: SHIPPED | OUTPATIENT
Start: 2018-11-30

## 2018-12-04 ENCOUNTER — LAB (OUTPATIENT)
Dept: LAB | Facility: HOSPITAL | Age: 48
End: 2018-12-04
Attending: FAMILY MEDICINE

## 2018-12-04 DIAGNOSIS — E66.01 MORBID OBESITY WITH BMI OF 50.0-59.9, ADULT (HCC): ICD-10-CM

## 2018-12-04 LAB
ARTICHOKE IGE QN: 89 MG/DL (ref 0–99)
CHOLEST SERPL-MCNC: 146 MG/DL (ref 130–200)
HDLC SERPL-MCNC: 43 MG/DL
LDLC/HDLC SERPL: 2.11 {RATIO}
TRIGL SERPL-MCNC: 61 MG/DL (ref 0–149)
TSH SERPL DL<=0.05 MIU/L-ACNC: 3.96 MIU/ML (ref 0.47–4.68)

## 2018-12-04 PROCEDURE — 36415 COLL VENOUS BLD VENIPUNCTURE: CPT

## 2018-12-04 PROCEDURE — 80061 LIPID PANEL: CPT | Performed by: FAMILY MEDICINE

## 2018-12-04 PROCEDURE — 84443 ASSAY THYROID STIM HORMONE: CPT | Performed by: FAMILY MEDICINE

## 2018-12-12 DIAGNOSIS — F41.9 ANXIETY: ICD-10-CM

## 2018-12-12 RX ORDER — BUPROPION HYDROCHLORIDE 150 MG/1
150 TABLET ORAL EVERY MORNING
Qty: 30 TABLET | Refills: 0 | Status: SHIPPED | OUTPATIENT
Start: 2018-12-12 | End: 2022-05-27

## 2019-01-21 ENCOUNTER — TELEPHONE (OUTPATIENT)
Dept: VASCULAR SURGERY | Facility: CLINIC | Age: 49
End: 2019-01-21

## 2019-01-21 NOTE — TELEPHONE ENCOUNTER
Left message reminding Ms Young of her appointment for Tuesday, January 22nd, 2019 at 915 am with Dr Jo. Also advised if she had any questions or needed to reschedule to please call the office at [phone].

## 2019-01-22 ENCOUNTER — HOSPITAL ENCOUNTER (OUTPATIENT)
Dept: SLEEP MEDICINE | Facility: HOSPITAL | Age: 49
End: 2019-01-22
Attending: FAMILY MEDICINE

## 2019-01-25 ENCOUNTER — TRANSCRIBE ORDERS (OUTPATIENT)
Dept: SLEEP MEDICINE | Facility: HOSPITAL | Age: 49
End: 2019-01-25

## 2019-01-25 ENCOUNTER — HOSPITAL ENCOUNTER (OUTPATIENT)
Dept: SLEEP MEDICINE | Facility: HOSPITAL | Age: 49
End: 2019-01-25
Attending: FAMILY MEDICINE

## 2019-01-25 DIAGNOSIS — G47.30 SLEEP APNEA, UNSPECIFIED TYPE: Primary | ICD-10-CM

## 2019-01-25 DIAGNOSIS — G47.31 PRIMARY CENTRAL SLEEP APNEA: Primary | ICD-10-CM

## 2019-01-28 ENCOUNTER — TELEPHONE (OUTPATIENT)
Dept: VASCULAR SURGERY | Facility: CLINIC | Age: 49
End: 2019-01-28

## 2019-01-28 NOTE — TELEPHONE ENCOUNTER
I called and left message for patient, asking if she would like to reschedule her appt that was canceled in Dec. 2018.

## 2019-01-30 ENCOUNTER — TRANSCRIBE ORDERS (OUTPATIENT)
Dept: ADMINISTRATIVE | Facility: HOSPITAL | Age: 49
End: 2019-01-30

## 2019-02-11 ENCOUNTER — HOSPITAL ENCOUNTER (OUTPATIENT)
Dept: SLEEP MEDICINE | Facility: HOSPITAL | Age: 49
Discharge: HOME | End: 2019-02-11
Attending: FAMILY MEDICINE | Admitting: FAMILY MEDICINE

## 2019-02-11 VITALS
OXYGEN SATURATION: 98 % | HEART RATE: 80 BPM | HEIGHT: 72 IN | RESPIRATION RATE: 12 BRPM | BODY MASS INDEX: 39.68 KG/M2 | WEIGHT: 293 LBS | DIASTOLIC BLOOD PRESSURE: 86 MMHG | SYSTOLIC BLOOD PRESSURE: 134 MMHG

## 2019-02-11 DIAGNOSIS — G47.31 PRIMARY CENTRAL SLEEP APNEA: ICD-10-CM

## 2019-02-11 PROCEDURE — 95810 POLYSOM 6/> YRS 4/> PARAM: CPT | Performed by: PSYCHIATRY & NEUROLOGY

## 2019-02-11 PROCEDURE — 95810 POLYSOM 6/> YRS 4/> PARAM: CPT

## 2019-02-18 ENCOUNTER — TELEPHONE (OUTPATIENT)
Dept: INTERNAL MEDICINE | Facility: CLINIC | Age: 49
End: 2019-02-18

## 2019-02-18 NOTE — TELEPHONE ENCOUNTER
Results show sleep apnea, pt should be contacted by sleep lab regarding results. She should contact them if she has not heard this week.

## 2019-02-19 NOTE — TELEPHONE ENCOUNTER
I tried calling the patient again with results, no answer.  I left the patient a message to return my call.

## 2019-02-19 NOTE — TELEPHONE ENCOUNTER
I tried calling patient regarding results, no answer.  I left the patient a message requesting she return my call.

## 2019-02-20 ENCOUNTER — HOSPITAL ENCOUNTER (OUTPATIENT)
Dept: SLEEP MEDICINE | Facility: HOSPITAL | Age: 49
End: 2019-02-20

## 2020-03-09 ENCOUNTER — HOSPITAL ENCOUNTER (OUTPATIENT)
Dept: GENERAL RADIOLOGY | Facility: HOSPITAL | Age: 50
Discharge: HOME | End: 2020-03-09
Admitting: NURSE PRACTITIONER

## 2020-03-09 PROCEDURE — 72100 X-RAY EXAM L-S SPINE 2/3 VWS: CPT

## 2021-03-10 ENCOUNTER — HOSPITAL ENCOUNTER (OUTPATIENT)
Dept: ULTRASOUND IMAGING | Facility: HOSPITAL | Age: 51
Discharge: HOME | End: 2021-03-10
Admitting: NURSE PRACTITIONER

## 2021-03-10 ENCOUNTER — TRANSCRIBE ORDERS (OUTPATIENT)
Dept: ADMINISTRATIVE | Facility: HOSPITAL | Age: 51
End: 2021-03-10

## 2021-03-10 DIAGNOSIS — N32.81 OVERACTIVE BLADDER: Primary | ICD-10-CM

## 2021-03-10 PROCEDURE — 76830 TRANSVAGINAL US NON-OB: CPT

## 2021-03-19 ENCOUNTER — TELEPHONE (OUTPATIENT)
Dept: OBGYN CLINIC | Age: 51
End: 2021-03-19

## 2021-04-12 ENCOUNTER — NURSE TRIAGE (OUTPATIENT)
Dept: CALL CENTER | Facility: HOSPITAL | Age: 51
End: 2021-04-12

## 2021-04-13 ENCOUNTER — TELEPHONE (OUTPATIENT)
Dept: OBGYN CLINIC | Age: 51
End: 2021-04-13

## 2021-04-13 NOTE — TELEPHONE ENCOUNTER
"    Reason for Disposition  • MILD or MODERATE vomiting (e.g., 1 - 5 times / day)    Additional Information  • Negative: Shock suspected (e.g., cold/pale/clammy skin, too weak to stand, low BP, rapid pulse)  • Negative: Difficult to awaken or acting confused (e.g., disoriented, slurred speech)  • Negative: Sounds like a life-threatening emergency to the triager  • Negative: Vomiting occurs only while coughing  • Negative: [1] Pregnant < 20 Weeks AND [2] nausea/vomiting began in early pregnancy (i.e., 4-8 weeks pregnant)  • Negative: Chest pain  • Negative: Headache is main symptom  • Negative: Vomiting (or Nausea) in a cancer patient who is currently (or recently) receiving chemotherapy or radiation therapy, or cancer patient who has metastatic or end-stage cancer and is receiving palliative care  • Negative: [1] Vomiting AND [2] contains red blood or black (\"coffee ground\") material  (Exception: few red streaks in vomit that only happened once)  • Negative: Severe pain in one eye  • Negative: Recent head injury (within last 3 days)  • Negative: Recent abdominal injury (within last 3 days)  • Negative: [1] Insulin-dependent diabetes (Type I) AND [2] glucose > 400 mg/dl (22 mmol/l)  • Negative: [1] SEVERE vomiting (e.g., 6 or more times/day) AND [2] present > 8 hours  • Negative: [1] MODERATE vomiting (e.g., 3 - 5 times/day) AND [2] age > 60  • Negative: Severe headache (e.g., excruciating) (Exception: similar to previous migraines)  • Negative: High-risk adult (e.g., diabetes mellitus, brain tumor, V-P shunt, hernia)  • Negative: [1] Drinking very little AND [2] dehydration suspected (e.g., no urine > 12 hours, very dry mouth, very lightheaded)  • Negative: Patient sounds very sick or weak to the triager  • Negative: [1] Vomiting AND [2] abdomen looks much more swollen than usual  • Negative: [1] Vomiting AND [2] contains bile (green color)  • Negative: [1] Constant abdominal pain AND [2] present > 2 hours  • " "Negative: [1] Fever > 103 F (39.4 C) AND [2] not able to get the fever down using Fever Care Advice  • Negative: [1] Fever > 101 F (38.3 C) AND [2] age > 60  • Negative: [1] Fever > 100.0 F (37.8 C) AND [2] bedridden (e.g., nursing home patient, CVA, chronic illness, recovering from surgery)  • Negative: [1] Fever > 100.0 F (37.8 C) AND [2] weak immune system (e.g., HIV positive, cancer chemo, splenectomy, organ transplant, chronic steroids)  • Negative: Taking any of the following medications: digoxin (Lanoxin), lithium, theophylline, phenytoin (Dilantin)  • Negative: [1] MILD or MODERATE vomiting AND [2] present > 48 hours (2 days) (Exception: mild vomiting with associated diarrhea)  • Negative: Fever present > 3 days (72 hours)  • Negative: Vomiting a prescription medication  • Negative: [1] MILD vomiting with diarrhea AND [2] present > 5 days  • Negative: Alcohol or drug abuse, known or suspected  • Negative: Vomiting is a chronic symptom (recurrent or ongoing AND present > 4 weeks)  • Negative: [1] SEVERE vomiting (e.g., 6 or more times/day, vomits everything) BUT [2] hydrated  • Negative: MILD vomiting with diarrhea  • Negative: Possibly pregnant, questions about    Answer Assessment - Initial Assessment Questions  1. VOMITING SEVERITY: \"How many times have you vomited in the past 24 hours?\"      - MILD:  1 - 2 times/day     - MODERATE: 3 - 5 times/day, decreased oral intake without significant weight loss or symptoms of dehydration     - SEVERE: 6 or more times/day, vomits everything or nearly everything, with significant weight loss, symptoms of dehydration       moderate  2. ONSET: \"When did the vomiting begin?\"       45 minutes ago  3. FLUIDS: \"What fluids or food have you vomited up today?\" \"Have you been able to keep any fluids down?\"      Ok until that time  4. ABDOMINAL PAIN: \"Are your having any abdominal pain?\" If yes : \"How bad is it and what does it feel like?\" (e.g., crampy, dull, intermittent, " "constant)       denies  5. DIARRHEA: \"Is there any diarrhea?\" If so, ask: \"How many times today?\"       denies  6. CONTACTS: \"Is there anyone else in the family with the same symptoms?\"       family  7. CAUSE: \"What do you think is causing your vomiting?\"      viru  8. HYDRATION STATUS: \"Any signs of dehydration?\" (e.g., dry mouth [not only dry lips], too weak to stand) \"When did you last urinate?\"      Well hydrated  9. OTHER SYMPTOMS: \"Do you have any other symptoms?\" (e.g., fever, headache, vertigo, vomiting blood or coffee grounds, recent head injury)      denies  10. PREGNANCY: \"Is there any chance you are pregnant?\" \"When was your last menstrual period?\"        na    Protocols used: VOMITING-ADULT-AH      "

## 2021-04-13 NOTE — TELEPHONE ENCOUNTER
BODØ called to reschedule a np appt. sick with stomach flu    Please be advised that the best time to call her to accommodate their needs is Anytime. Thank you.

## 2021-04-27 ENCOUNTER — OFFICE VISIT (OUTPATIENT)
Dept: OBGYN CLINIC | Age: 51
End: 2021-04-27
Payer: MEDICAID

## 2021-04-27 VITALS
WEIGHT: 293 LBS | DIASTOLIC BLOOD PRESSURE: 88 MMHG | HEIGHT: 72 IN | SYSTOLIC BLOOD PRESSURE: 134 MMHG | HEART RATE: 88 BPM | BODY MASS INDEX: 39.68 KG/M2

## 2021-04-27 DIAGNOSIS — Z11.51 SCREENING FOR HUMAN PAPILLOMAVIRUS (HPV): ICD-10-CM

## 2021-04-27 DIAGNOSIS — R32 ENURESIS: ICD-10-CM

## 2021-04-27 DIAGNOSIS — N32.81 OAB (OVERACTIVE BLADDER): Primary | ICD-10-CM

## 2021-04-27 DIAGNOSIS — D25.9 UTERINE LEIOMYOMA, UNSPECIFIED LOCATION: ICD-10-CM

## 2021-04-27 DIAGNOSIS — Z12.4 SCREENING FOR CERVICAL CANCER: ICD-10-CM

## 2021-04-27 DIAGNOSIS — N85.2 UTERINE ENLARGEMENT: ICD-10-CM

## 2021-04-27 PROCEDURE — 99204 OFFICE O/P NEW MOD 45 MIN: CPT | Performed by: NURSE PRACTITIONER

## 2021-04-27 RX ORDER — AMLODIPINE BESYLATE 5 MG/1
TABLET ORAL
COMMUNITY
Start: 2021-01-26

## 2021-04-27 RX ORDER — ONDANSETRON HYDROCHLORIDE 8 MG/1
TABLET, FILM COATED ORAL
COMMUNITY
Start: 2021-04-13

## 2021-04-27 RX ORDER — MECLIZINE HYDROCHLORIDE 25 MG/1
TABLET ORAL
COMMUNITY
Start: 2021-04-16

## 2021-04-27 RX ORDER — FLUTICASONE PROPIONATE 50 MCG
SPRAY, SUSPENSION (ML) NASAL
COMMUNITY
Start: 2021-04-16

## 2021-04-27 RX ORDER — PHENTERMINE HYDROCHLORIDE 37.5 MG/1
37.5 CAPSULE ORAL EVERY MORNING
COMMUNITY

## 2021-04-27 ASSESSMENT — ENCOUNTER SYMPTOMS
GASTROINTESTINAL NEGATIVE: 1
RESPIRATORY NEGATIVE: 1
EYES NEGATIVE: 1

## 2021-04-27 NOTE — PROGRESS NOTES
Windy Fernandes is a 45 y.o. female who presents today for her medical conditions/ complaints as noted below. Windy Fernandes is c/o of Establish Care and Results        HPI  Pt presents today as a new patient to discuss US results   US done due to bladder issues but was told to see GYN too-leaks urine and enuresis  US with at least 3 large fibroids. Uterine enlargement. No pain or irregular bleeding complaints. Had ablation 2005ish, had ? Fibroids but was removed with ablation she was told. No periods since then. Not hot flashes. Last mammogram:  Never  Last pap smear: 2 years ago   Contraception:  ablation    Last bone density:  never  Last colonoscopy: never  No LMP recorded. Patient has had an ablation. No obstetric history on file. Past Medical History:   Diagnosis Date    Hypertension      Past Surgical History:   Procedure Laterality Date     SECTION       Family History   Problem Relation Age of Onset    High Blood Pressure Mother     High Blood Pressure Father     Diabetes Father      Social History     Tobacco Use    Smoking status: Never Smoker    Smokeless tobacco: Never Used   Substance Use Topics    Alcohol use: No       Current Outpatient Medications   Medication Sig Dispense Refill    amLODIPine (NORVASC) 5 MG tablet TAKE 1 TABLET BY MOUTH EVERY DAY      fluticasone (FLONASE) 50 MCG/ACT nasal spray       meclizine (ANTIVERT) 25 MG tablet       ondansetron (ZOFRAN) 8 MG tablet TAKE 1 TABLET BY MOUTH EVERY DAY AS NEEDED      phentermine 37.5 MG capsule Take 37.5 mg by mouth every morning.  hydrochlorothiazide (HYDRODIURIL) 50 MG tablet Take 50 mg by mouth daily      benazepril (LOTENSIN) 20 MG tablet Take 20 mg by mouth daily      sertraline (ZOLOFT) 50 MG tablet Take 50 mg by mouth daily       No current facility-administered medications for this visit.       No Known Allergies  Vitals:    21 1408   BP: 134/88   Pulse: 88     Body mass index is 57.13

## 2021-04-29 ENCOUNTER — TELEPHONE (OUTPATIENT)
Dept: OBGYN CLINIC | Age: 51
End: 2021-04-29

## 2021-04-30 LAB
HPV COMMENT: NORMAL
HPV TYPE 16: NOT DETECTED
HPV TYPE 18: NOT DETECTED
HPVOH (OTHER TYPES): NOT DETECTED

## 2021-05-14 ENCOUNTER — TELEMEDICINE (OUTPATIENT)
Dept: OBGYN CLINIC | Age: 51
End: 2021-05-14
Payer: MEDICAID

## 2021-05-14 DIAGNOSIS — R35.0 URINARY FREQUENCY: ICD-10-CM

## 2021-05-14 DIAGNOSIS — N85.2 UTERINE ENLARGEMENT: ICD-10-CM

## 2021-05-14 DIAGNOSIS — N39.42 URINARY INCONTINENCE WITHOUT SENSORY AWARENESS: ICD-10-CM

## 2021-05-14 DIAGNOSIS — Z98.890 HISTORY OF ENDOMETRIAL ABLATION: ICD-10-CM

## 2021-05-14 DIAGNOSIS — D25.9 UTERINE LEIOMYOMA, UNSPECIFIED LOCATION: Primary | ICD-10-CM

## 2021-05-14 PROCEDURE — 99213 OFFICE O/P EST LOW 20 MIN: CPT | Performed by: OBSTETRICS & GYNECOLOGY

## 2021-05-14 ASSESSMENT — ENCOUNTER SYMPTOMS
GASTROINTESTINAL NEGATIVE: 1
EYES NEGATIVE: 1
RESPIRATORY NEGATIVE: 1

## 2021-05-14 NOTE — PROGRESS NOTES
2021    TELEHEALTH EVALUATION -- Audio/Visual (During [facility]-61 public health emergency)    HPI:    Sanna Amador (:  1970) has requested an audio/video evaluation for the following concern(s):    Pt presents today to discuss a hysterectomy for uterine fibroids. Notes some pressure but no pain. Pt states she was having urinary leakage, had an ultrasound and discovered the fibroids. Review of Systems   Constitutional: Negative. HENT: Negative. Eyes: Negative. Respiratory: Negative. Cardiovascular: Negative. Gastrointestinal: Negative. Genitourinary: Negative. Negative for dysuria, frequency, menstrual problem, pelvic pain, urgency and vaginal discharge. Skin: Negative. Neurological: Negative. Psychiatric/Behavioral: Negative. Past Medical History:   Diagnosis Date    Hypertension        Past Surgical History:   Procedure Laterality Date     SECTION         Family History   Problem Relation Age of Onset    High Blood Pressure Mother     High Blood Pressure Father     Diabetes Father        Social History     Tobacco Use    Smoking status: Never Smoker    Smokeless tobacco: Never Used   Substance Use Topics    Alcohol use: No    Drug use: No       Prior to Visit Medications    Medication Sig Taking? Authorizing Provider   amLODIPine (NORVASC) 5 MG tablet TAKE 1 TABLET BY MOUTH EVERY DAY  Historical Provider, MD   fluticasone (FLONASE) 50 MCG/ACT nasal spray   Historical Provider, MD   meclizine (ANTIVERT) 25 MG tablet   Historical Provider, MD   ondansetron (ZOFRAN) 8 MG tablet TAKE 1 TABLET BY MOUTH EVERY DAY AS NEEDED  Historical Provider, MD   phentermine 37.5 MG capsule Take 37.5 mg by mouth every morning.   Historical Provider, MD   hydrochlorothiazide (HYDRODIURIL) 50 MG tablet Take 50 mg by mouth daily  Historical Provider, MD   benazepril (LOTENSIN) 20 MG tablet Take 20 mg by mouth daily  Historical Provider, MD   sertraline (ZOLOFT) 50 MG tablet Take 50 mg by mouth daily  Historical Provider, MD       No Known Allergies          PHYSICAL EXAMINATION  Physical Exam  Constitutional:       General: She is not in acute distress. Appearance: Normal appearance. She is not ill-appearing or diaphoretic. HENT:      Head: Normocephalic and atraumatic. Nose: Nose normal. No rhinorrhea. Eyes:      General: No scleral icterus. Right eye: No discharge. Left eye: No discharge. Extraocular Movements: Extraocular movements intact. Pulmonary:      Effort: Pulmonary effort is normal. No respiratory distress. Musculoskeletal: Normal range of motion. Skin:     Coloration: Skin is not pale. Findings: No erythema or rash. Neurological:      Mental Status: She is alert and oriented to person, place, and time. Psychiatric:         Attention and Perception: Attention and perception normal.         Mood and Affect: Mood and affect normal.         Speech: Speech normal.         Behavior: Behavior normal. Behavior is cooperative. Thought Content: Thought content normal.         Cognition and Memory: Cognition and memory normal.         Judgment: Judgment normal.           ASSESSMENT   Diagnosis Orders   1. Uterine leiomyoma, unspecified location     2. Uterine enlargement     3. History of endometrial ablation     4. Urinary frequency     5. Urinary incontinence without sensory awareness         PLAN  1. Sono reviewed. Pt told that fibroids usually cause urinary frequency rather than incontinence. 2. Recommend referral to Uro Gyn to assess urodynamics  3. Not a good surgical candidate given BMI. Told pt that should she need, would most likely have to have a Total Abdominal Hysterectomy. Yuan Lyles am scribing for and in the presence of Dr. Niharika Giraldo.     I, Dr. Niharika Giraldo, personally performed the services described in this documentation as scribed by Camila Fox in my presence, and it is both accurate and complete. Bella Langley is a 45 y.o. female being evaluated by a Virtual Visit (video visit) encounter to address concerns as mentioned above. A caregiver was present when appropriate. Due to this being a TeleHealth encounter (During [facility] public health emergency), evaluation of the following organ systems was limited: Vitals/Constitutional/EENT/Resp/CV/GI//MS/Neuro/Skin/Heme-Lymph-Imm. Pursuant to the emergency declaration under the [address] [place] [address], [address] [place] [address] and the [employer] Act, this Virtual Visit was conducted with patient's (and/or legal guardian's) consent, to reduce the patient's risk of exposure to COVID-19 and provide necessary medical care. The patient (and/or legal guardian) has also been advised to contact this office for worsening conditions or problems, and seek emergency medical treatment and/or call 911 if deemed necessary. Patient identification was verified at the start of the visit: Yes    Total time spent on this encounter: Not billed by time    Services were provided through a video synchronous discussion virtually to substitute for in-person clinic visit. Patient and provider were located at their individual homes. --Jerad Aoyub RN on 5/14/2021 at 2:07 PM    An electronic signature was used to authenticate this note.

## 2021-05-18 ENCOUNTER — TELEPHONE (OUTPATIENT)
Dept: OBGYN CLINIC | Age: 51
End: 2021-05-18

## 2021-05-18 DIAGNOSIS — N32.81 OAB (OVERACTIVE BLADDER): ICD-10-CM

## 2021-05-18 DIAGNOSIS — R35.0 URINARY FREQUENCY: ICD-10-CM

## 2021-05-18 DIAGNOSIS — N85.2 UTERINE ENLARGEMENT: ICD-10-CM

## 2021-05-18 DIAGNOSIS — N39.42 URINARY INCONTINENCE WITHOUT SENSORY AWARENESS: ICD-10-CM

## 2021-05-18 DIAGNOSIS — D25.9 UTERINE LEIOMYOMA, UNSPECIFIED LOCATION: Primary | ICD-10-CM

## 2021-05-18 DIAGNOSIS — Z98.890 HISTORY OF ENDOMETRIAL ABLATION: ICD-10-CM

## 2021-05-18 NOTE — TELEPHONE ENCOUNTER
Called and informed pt that Dr. Golden Guallpa does not accept her insurance. I told her I called for a referral to [facility], Dr. Mckenna Henley. I told her that I will send her records and their office will call her with an appt. Pt v/u.

## 2021-05-18 NOTE — TELEPHONE ENCOUNTER
Called [facility], Dr. Brianna Conrad's office to make a referral.  They asked that I send records and referral and they will call the patient with an appt.

## 2021-05-18 NOTE — TELEPHONE ENCOUNTER
Called and informed pt that I could not refer her to Dr. Marti Solano due to insurance. I referred her to [facility], Dr. Sheryl Saunders and told her that their office will call her with an appt. All records faxed. Pt v/u.

## 2021-07-16 ENCOUNTER — TELEPHONE (OUTPATIENT)
Dept: OBGYN CLINIC | Age: 51
End: 2021-07-16

## 2021-07-16 NOTE — TELEPHONE ENCOUNTER
Spoke to pt. She had an appt there on July 2nd. Called his office back, cancelled upcoming appt & asked for office notes.

## 2021-07-16 NOTE — TELEPHONE ENCOUNTER
Pt referral fell into my [address] [place] [address]. Called Dr Sg Cody' office to see if pt had an appt. No appt. I have scheduled an appt for 08.30.21 at 2:00. They will send a new patient packet with directions, etc.  Pt needs to arrive 30 min early. Vm for pt to call me back.

## 2022-02-08 ENCOUNTER — OFFICE VISIT (OUTPATIENT)
Dept: NEUROLOGY | Facility: CLINIC | Age: 52
End: 2022-02-08

## 2022-02-08 VITALS
HEIGHT: 72 IN | BODY MASS INDEX: 39.68 KG/M2 | SYSTOLIC BLOOD PRESSURE: 132 MMHG | DIASTOLIC BLOOD PRESSURE: 62 MMHG | WEIGHT: 293 LBS | HEART RATE: 72 BPM | OXYGEN SATURATION: 97 %

## 2022-02-08 DIAGNOSIS — G47.33 OBSTRUCTIVE SLEEP APNEA: Primary | ICD-10-CM

## 2022-02-08 PROCEDURE — 99213 OFFICE O/P EST LOW 20 MIN: CPT | Performed by: NURSE PRACTITIONER

## 2022-02-08 RX ORDER — AMLODIPINE BESYLATE 5 MG/1
5 TABLET ORAL DAILY
COMMUNITY
Start: 2021-12-29

## 2022-02-08 RX ORDER — FLUTICASONE PROPIONATE 50 MCG
2 SPRAY, SUSPENSION (ML) NASAL DAILY
COMMUNITY
Start: 2022-01-25

## 2022-02-08 RX ORDER — ERGOCALCIFEROL 1.25 MG/1
50000 CAPSULE ORAL WEEKLY
COMMUNITY
Start: 2022-01-28

## 2022-02-08 RX ORDER — CETIRIZINE HYDROCHLORIDE 10 MG/1
10 TABLET ORAL DAILY
COMMUNITY
Start: 2022-01-25

## 2022-02-08 RX ORDER — OXYBUTYNIN CHLORIDE 15 MG/1
15 TABLET, EXTENDED RELEASE ORAL DAILY
COMMUNITY
Start: 2021-12-29

## 2022-02-08 NOTE — PATIENT INSTRUCTIONS

## 2022-02-08 NOTE — PROGRESS NOTES
Neurology Progress Note      Chief Complaint:    Obstructive sleep apnea     Subjective     Subjective:  Chioma Young is a 51 y.o. female who presents to the office today for obstructive sleep apnea.  She is being followed by SETH Quinonez for primary care.  She is referred to our office by her PCP regarding evaluation of her LANIE.  [facility] records review, it appears she has had a polysomnography back on 2/11/20219 which revealed an AHI of 21.5.  She did have follow-up appointments with our office (Nette Martinez and Femi Deras) which were missed.  She doesn't appear to have had any follow-up care regarding her LANIE.    She presents today with continued complaints of sleep apnea symptoms.  She states that around the time she had a sleep study she received a CPAP machine; however, she states that she lost her insurance and had to forfeit her machine related to not being able to pay for it.  She states that since that time she has not had CPAP therapy.  She tells me today that she is undergoing bariatric surgery in [facility] and reports they wanted her to be evaluated for her sleep apnea again.  She continues to report snoring, multiple awakenings at night, awakenings with gasping, nonrestorative sleep, periodic leg movements during sleep, and daytime somnolence.  She states that she will find herself frequently nodding to sleep throughout the day.  She states she has never had any issues with driving.  She is curious that she needs another sleep study.  We did have a lengthy discussion today regarding what sleep apnea is and the fact that it can have on her body with being untreated.  She has no further questions.    Past Medical History:   Diagnosis Date   • Anxiety    • Chronic ulcer of leg (HCC)    • Chronic ulcer of right foot (HCC)    • Chronic venous insufficiency    • Hypertension    • Lymphedema    • Morbid obesity (HCC)    • Synovial cyst of lumbar spine      Past Surgical History:   Procedure  Laterality Date   •  SECTION     • ENDOMETRIAL ABLATION       Family History   Problem Relation Age of Onset   • Depression Mother    • Hypertension Mother    • Diabetes Father    • Hypertension Father    • Diabetes Sister    • Hypertension Sister    • Hypertension Brother    • Hypertension Maternal Grandmother    • Dementia Maternal Grandfather    • Diabetes Paternal Grandmother      Social History     Tobacco Use   • Smoking status: Never Smoker   • Smokeless tobacco: Never Used   Substance Use Topics   • Alcohol use: No   • Drug use: No     Medications:  Current Outpatient Medications   Medication Sig Dispense Refill   • amLODIPine (NORVASC) 5 MG tablet Take 5 mg by mouth Daily.     • benazepril (LOTENSIN) 20 MG tablet Take 1 tablet by mouth Daily. 30 tablet 5   • buPROPion XL (WELLBUTRIN XL) 150 MG 24 hr tablet Take 1 tablet by mouth Every Morning. 30 tablet 0   • cetirizine (zyrTEC) 10 MG tablet Take 10 mg by mouth Daily.     • fluticasone (FLONASE) 50 MCG/ACT nasal spray 2 sprays by Each Nare route Daily.     • metFORMIN (GLUCOPHAGE) 500 MG tablet Take 500 mg by mouth 2 (Two) Times a Day With Meals.     • oxybutynin XL (DITROPAN XL) 15 MG 24 hr tablet Take 15 mg by mouth Daily.     • sertraline (ZOLOFT) 50 MG tablet Take 50 mg by mouth Daily.     • vitamin D (ERGOCALCIFEROL) 1.25 MG (30935 UT) capsule capsule Take 50,000 Units by mouth 1 (One) Time Per Week.     • Aspirin-Caffeine (AIXA BACK & BODY PO) Take  by mouth As Needed.     • bacitracin 500 UNIT/GM ointment Apply  topically to the appropriate area as directed 2 (Two) Times a Day. Apply to left foot/leg wound 28 g 0   • clotrimazole-betamethasone (LOTRISONE) 1-0.05 % cream Apply  topically to the appropriate area as directed Every 12 (Twelve) Hours. Until rash resolves.  Avoid use on face and breasts 45 g 0   • hydrochlorothiazide (HYDRODIURIL) 50 MG tablet Take 1 tablet by mouth Daily. 30 tablet 5     No current facility-administered  medications for this visit.     Current outpatient and discharge medications have been reconciled for the patient.  Reviewed by: SETH Ruano      Allergies:    Patient has no known allergies.    Review of Systems:   Review of Systems   Constitutional: Negative.    Psychiatric/Behavioral: Positive for sleep disturbance.   All other systems reviewed and are negative.      Objective      Vital Signs  Heart Rate:  [72] 72  BP: (132)/(62) 132/62    Physical Exam:  Physical Exam  Constitutional:       Appearance: Normal appearance. She is morbidly obese.   HENT:      Head: Normocephalic.   Eyes:      Extraocular Movements: Extraocular movements intact.      Pupils: Pupils are equal, round, and reactive to light.   Cardiovascular:      Rate and Rhythm: Normal rate and regular rhythm.      Pulses: Normal pulses.   Pulmonary:      Effort: Pulmonary effort is normal.   Musculoskeletal:         General: Normal range of motion.      Cervical back: Normal range of motion and neck supple.   Skin:     General: Skin is warm and dry.      Capillary Refill: Capillary refill takes less than 2 seconds.   Neurological:      Gait: Gait is intact.   Psychiatric:         Mood and Affect: Mood normal.         Neck Circumference: 16.5 inches  Mallampati Classification: III (soft and hard palate and base of uvula visible)       Results Review:      Aurelia Sleepiness Scale: 12    STOP-BANG: High risk LANIE     Polysomnography 4 or More Parameters (02/11/2019 22:13)      Assessment/Plan     Impression:  Obstructive sleep apnea    Plan:  I would like to initiate CPAP therapy for the patient today.  I will send CPAP orders to [facility] in [place] for an AutoPap CPAP device with a minimum pressure of 8 cm H2O and a maximum pressure of 16 cm H2O.    I will see the patient back in approximately 3 months.  At that time we evaluate how she is doing with her CPAP.  She continues to have symptomatology I will likely order overnight oximetry  to ensure that she is having adequate benefit from CPAP therapy.  She states understanding.    I did discuss, in detail, the results of her previous sleep study back in 2019 in addition to extensive education on what sleep apnea is, treatments, and the importance of treating sleep apnea.  All of her questions were answered at this time.  She states understanding.  She has no further questions.    I have recommended regular cardiovascular exercise in the form of walking, biking or swimming 30-40 minutes at a time at least 3-4 times per week.    Counseled on multimodal approach to treatment of sleep apnea to include but not limited to diet, exercise, sleep hygiene, compliance with pap therapy.     Encouraged lateral sleeping position and to avoid sedatives or alcohol close to bedtime.     Risks of untreated sleep apnea were discussed to include but not limited to HTN, heart disease, stroke, cardiac arrhythmia such as AFIB, and dementia.    The plan of care was fully discussed with the patient and they are in full agreement at this time.     Follow-Up:  Return in about 3 months (around 5/8/2022) for Obstructive sleep apnea follow-up.      Chaparro Vergara, SETH  02/08/22  13:49 CST

## 2022-05-27 ENCOUNTER — OFFICE VISIT (OUTPATIENT)
Dept: NEUROLOGY | Facility: CLINIC | Age: 52
End: 2022-05-27

## 2022-05-27 VITALS
SYSTOLIC BLOOD PRESSURE: 108 MMHG | DIASTOLIC BLOOD PRESSURE: 70 MMHG | HEIGHT: 72 IN | HEART RATE: 66 BPM | WEIGHT: 293 LBS | BODY MASS INDEX: 39.68 KG/M2 | RESPIRATION RATE: 17 BRPM | OXYGEN SATURATION: 98 %

## 2022-05-27 DIAGNOSIS — G47.33 OBSTRUCTIVE SLEEP APNEA: Primary | ICD-10-CM

## 2022-05-27 PROCEDURE — 99213 OFFICE O/P EST LOW 20 MIN: CPT | Performed by: NURSE PRACTITIONER

## 2022-05-27 RX ORDER — PROMETHAZINE HYDROCHLORIDE 25 MG/1
TABLET ORAL
COMMUNITY
Start: 2022-05-18

## 2022-05-27 RX ORDER — FLUCONAZOLE 150 MG/1
150 TABLET ORAL DAILY
COMMUNITY
Start: 2022-03-28

## 2022-05-27 RX ORDER — CHOLECALCIFEROL (VITAMIN D3) 1250 MCG
50000 CAPSULE ORAL WEEKLY
COMMUNITY
Start: 2022-04-28

## 2022-05-27 NOTE — PROGRESS NOTES
Neurology Progress Note      Chief Complaint:    Obstructive sleep apnea     Subjective     Subjective:  Chioma Young is a 51 y.o. female who presents to the office today for obstructive sleep apnea.  She is routinely followed by Jordyn Monterroso APRN for primary care.  She reports that she has had her machine for about a month and really likes how it is helping.  She reports that she is sleeping better and is now having much more restorative sleep.  She indicates that she is able to sleep throughout the night, doesn't awaken numerous times, and doesn't snore over therapy.  She denies daytime hypersomnolence at this time.  She really feels that this is benefiting her well. She has also undergone her bariatric surgery for gastric sleeve and reports she is doing phenomenal with recovery.  She is hoping to have a mask fitting to try another mask.  Otherwise, there are no concerns.     Past Medical History:   Diagnosis Date   • Anxiety    • Chronic ulcer of leg (HCC)    • Chronic ulcer of right foot (HCC)    • Chronic venous insufficiency    • Hypertension    • Lymphedema    • Morbid obesity (HCC)    • Synovial cyst of lumbar spine      Past Surgical History:   Procedure Laterality Date   •  SECTION     • ENDOMETRIAL ABLATION       Family History   Problem Relation Age of Onset   • Depression Mother    • Hypertension Mother    • Diabetes Father    • Hypertension Father    • Diabetes Sister    • Hypertension Sister    • Hypertension Brother    • Hypertension Maternal Grandmother    • Dementia Maternal Grandfather    • Diabetes Paternal Grandmother      Social History     Tobacco Use   • Smoking status: Never Smoker   • Smokeless tobacco: Never Used   Substance Use Topics   • Alcohol use: No   • Drug use: No     Medications:  Current Outpatient Medications   Medication Sig Dispense Refill   • amLODIPine (NORVASC) 5 MG tablet Take 5 mg by mouth Daily.     • Aspirin-Caffeine (AIXA BACK & BODY PO) Take  by  mouth As Needed.     • bacitracin 500 UNIT/GM ointment Apply  topically to the appropriate area as directed 2 (Two) Times a Day. Apply to left foot/leg wound 28 g 0   • benazepril (LOTENSIN) 20 MG tablet Take 1 tablet by mouth Daily. 30 tablet 5   • cetirizine (zyrTEC) 10 MG tablet Take 10 mg by mouth Daily.     • Cholecalciferol (Vitamin D3) 1.25 MG (83106 UT) capsule Take 50,000 Units by mouth 1 (One) Time Per Week.     • clotrimazole-betamethasone (LOTRISONE) 1-0.05 % cream Apply  topically to the appropriate area as directed Every 12 (Twelve) Hours. Until rash resolves.  Avoid use on face and breasts 45 g 0   • fluconazole (DIFLUCAN) 150 MG tablet Take 150 mg by mouth Daily.     • fluticasone (FLONASE) 50 MCG/ACT nasal spray 2 sprays by Each Nare route Daily.     • hydrochlorothiazide (HYDRODIURIL) 50 MG tablet Take 1 tablet by mouth Daily. 30 tablet 5   • oxybutynin XL (DITROPAN XL) 15 MG 24 hr tablet Take 15 mg by mouth Daily.     • promethazine (PHENERGAN) 25 MG tablet TAKE 1 TABLET BY MOUTH EVERY 4 HOURS AS NEEDED FOR NAUSEA AND OR VOMTING     • sertraline (ZOLOFT) 50 MG tablet Take 50 mg by mouth Daily.     • vitamin D (ERGOCALCIFEROL) 1.25 MG (66733 UT) capsule capsule Take 50,000 Units by mouth 1 (One) Time Per Week.       No current facility-administered medications for this visit.     Current outpatient and discharge medications have been reconciled for the patient.  Reviewed by: SETH Ruano      Allergies:    Patient has no known allergies.    Review of Systems:   Review of Systems   Psychiatric/Behavioral: Positive for sleep disturbance.   All other systems reviewed and are negative.        Objective      Vital Signs  Heart Rate:  [66] 66  Resp:  [17] 17  BP: (108)/(70) 108/70    Physical Exam:  Physical Exam  Constitutional:       Appearance: Normal appearance. She is obese.   HENT:      Head: Normocephalic.   Eyes:      Extraocular Movements: Extraocular movements intact.      Pupils:  Pupils are equal, round, and reactive to light.   Cardiovascular:      Rate and Rhythm: Normal rate and regular rhythm.      Pulses: Normal pulses.   Pulmonary:      Effort: Pulmonary effort is normal.   Musculoskeletal:         General: Normal range of motion.      Cervical back: Normal range of motion and neck supple.   Skin:     General: Skin is warm and dry.      Capillary Refill: Capillary refill takes less than 2 seconds.   Neurological:      Gait: Gait is intact.   Psychiatric:         Mood and Affect: Mood normal.         Neck Circumference: 17.5 inches  Mallampati Classification: II (hard and soft palate, upper portion of tonsils anduvula visible)       Results Review:      Crosby Sleepiness Scale: 10    STOP-BANG: Moderate Risk LANIE    Compliance Report:   This compliance report is for the dates of 4/27/2022-5/26/2022.  The patient used the PAP device for 29/30 days for a 97% compliance.  Of those days, the device was used for greater than 4 hours for 23 days for a 77% compliance.  The patient is set on APAP with a minimum pressure of 8cmH2O and a maximum pressure of 15psC7F.  AHI is currently 0.9.    Assessment/Plan     Impression:  • Obstructive sleep apnea  • Appropriate compliance with CPAP      Plan:  · Continue with CPAP therapy. The patient recognizes the need for adherence to the prescribed therapy.      · Order placed for mask fitting with [employer] company.    · I have recommended regular cardiovascular exercise in the form of walking, biking or swimming 30-40 minutes at a time at least 3-4 times per week.    · Counseled on multimodal approach to treatment of sleep apnea to include but not limited to diet, exercise, sleep hygiene, compliance with pap therapy.     · Encouraged lateral sleeping position and to avoid sedatives or alcohol close to bedtime.     · Risks of untreated sleep apnea were discussed to include but not limited to HTN, heart disease, stroke, cardiac arrhythmia such as AFIB, and  dementia.    · I have reviewed the current compliance download with the patient in detail.  She  understands that a certain level of compliance allows for continued insurance coverage as well as adequate treatment of underlying apnea.  She also understands the impact this has upon their overall health status and other medical comorbidities.     The plan of care was fully discussed with the patient and they are in full agreement at this time.     Follow-Up:  Return in about 1 year (around 5/27/2023) for Obstructive sleep apnea follow-up, Annual compliance review.      Chaparro Vergara, APRN  05/27/22  13:50 CDT

## 2023-06-21 ENCOUNTER — HOSPITAL ENCOUNTER (OUTPATIENT)
Dept: ULTRASOUND IMAGING | Age: 53
Discharge: HOME | End: 2023-06-21
Payer: MEDICAID

## 2023-06-21 DIAGNOSIS — E07.9 DISORDER OF THYROID, UNSPECIFIED: ICD-10-CM

## 2023-06-21 PROCEDURE — 76536 US EXAM OF HEAD AND NECK: CPT

## 2023-08-25 ENCOUNTER — HOSPITAL ENCOUNTER (OUTPATIENT)
Dept: CT IMAGING | Age: 53
Discharge: HOME | End: 2023-08-25
Payer: MEDICAID

## 2023-08-25 DIAGNOSIS — I72.0 CAROTID ARTERY ANEURYSM (HCC): ICD-10-CM

## 2023-08-25 PROCEDURE — 70498 CT ANGIOGRAPHY NECK: CPT

## 2023-08-25 PROCEDURE — 6360000004 HC RX CONTRAST MEDICATION: Performed by: NURSE PRACTITIONER

## 2023-08-25 RX ADMIN — IOPAMIDOL 75 ML: 755 INJECTION, SOLUTION INTRAVENOUS at 09:46
